# Patient Record
Sex: MALE | Race: WHITE | NOT HISPANIC OR LATINO
[De-identification: names, ages, dates, MRNs, and addresses within clinical notes are randomized per-mention and may not be internally consistent; named-entity substitution may affect disease eponyms.]

---

## 2017-11-03 ENCOUNTER — RX RENEWAL (OUTPATIENT)
Age: 37
End: 2017-11-03

## 2017-11-03 PROBLEM — Z00.00 ENCOUNTER FOR PREVENTIVE HEALTH EXAMINATION: Status: ACTIVE | Noted: 2017-11-03

## 2017-11-03 RX ORDER — VALACYCLOVIR 500 MG/1
500 TABLET, FILM COATED ORAL
Qty: 90 | Refills: 0 | Status: ACTIVE | COMMUNITY
Start: 2017-11-03 | End: 1900-01-01

## 2021-07-16 ENCOUNTER — EMERGENCY (EMERGENCY)
Facility: HOSPITAL | Age: 41
LOS: 0 days | Discharge: HOME | End: 2021-07-16
Attending: EMERGENCY MEDICINE
Payer: OTHER MISCELLANEOUS

## 2021-07-16 VITALS — WEIGHT: 250 LBS | HEIGHT: 72 IN

## 2021-07-16 VITALS
RESPIRATION RATE: 16 BRPM | SYSTOLIC BLOOD PRESSURE: 130 MMHG | DIASTOLIC BLOOD PRESSURE: 78 MMHG | OXYGEN SATURATION: 99 %

## 2021-07-16 DIAGNOSIS — T63.441A TOXIC EFFECT OF VENOM OF BEES, ACCIDENTAL (UNINTENTIONAL), INITIAL ENCOUNTER: ICD-10-CM

## 2021-07-16 DIAGNOSIS — T78.40XA ALLERGY, UNSPECIFIED, INITIAL ENCOUNTER: ICD-10-CM
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
## 2021-07-16 PROCEDURE — 96374 THER/PROPH/DIAG INJ IV PUSH: CPT

## 2021-07-16 PROCEDURE — 99284 EMERGENCY DEPT VISIT MOD MDM: CPT | Mod: 25

## 2021-07-16 PROCEDURE — 96375 TX/PRO/DX INJ NEW DRUG ADDON: CPT

## 2021-07-16 PROCEDURE — 93005 ELECTROCARDIOGRAM TRACING: CPT

## 2021-07-16 PROCEDURE — 99285 EMERGENCY DEPT VISIT HI MDM: CPT

## 2021-07-16 PROCEDURE — 93010 ELECTROCARDIOGRAM REPORT: CPT

## 2021-07-16 PROCEDURE — 96372 THER/PROPH/DIAG INJ SC/IM: CPT | Mod: XU

## 2021-07-16 RX ORDER — FAMOTIDINE 10 MG/ML
20 INJECTION INTRAVENOUS ONCE
Refills: 0 | Status: COMPLETED | OUTPATIENT
Start: 2021-07-16 | End: 2021-07-16

## 2021-07-16 RX ORDER — EPINEPHRINE 0.3 MG/.3ML
0.3 INJECTION INTRAMUSCULAR; SUBCUTANEOUS ONCE
Refills: 0 | Status: COMPLETED | OUTPATIENT
Start: 2021-07-16 | End: 2021-07-16

## 2021-07-16 RX ORDER — FAMOTIDINE 10 MG/ML
1 INJECTION INTRAVENOUS
Qty: 14 | Refills: 0
Start: 2021-07-16

## 2021-07-16 RX ORDER — EPINEPHRINE 0.3 MG/.3ML
0.3 INJECTION INTRAMUSCULAR; SUBCUTANEOUS
Qty: 1 | Refills: 0
Start: 2021-07-16

## 2021-07-16 RX ORDER — DIPHENHYDRAMINE HCL 50 MG
50 CAPSULE ORAL ONCE
Refills: 0 | Status: COMPLETED | OUTPATIENT
Start: 2021-07-16 | End: 2021-07-16

## 2021-07-16 RX ORDER — DIPHENHYDRAMINE HCL 50 MG
2 CAPSULE ORAL
Qty: 30 | Refills: 0
Start: 2021-07-16 | End: 2021-07-20

## 2021-07-16 RX ADMIN — FAMOTIDINE 20 MILLIGRAM(S): 10 INJECTION INTRAVENOUS at 11:32

## 2021-07-16 RX ADMIN — Medication 50 MILLIGRAM(S): at 11:31

## 2021-07-16 RX ADMIN — Medication 125 MILLIGRAM(S): at 11:36

## 2021-07-16 RX ADMIN — EPINEPHRINE 0.3 MILLIGRAM(S): 0.3 INJECTION INTRAMUSCULAR; SUBCUTANEOUS at 11:33

## 2021-07-16 NOTE — ED PROVIDER NOTE - OBJECTIVE STATEMENT
42 y/o  male with a PMHx of allergies of bee sting presents to the ED c/o allergic rxn to a bee sting PTA.  Has local swelling in right forearm  and rash. States he is 'feeling funny' and has an upper chest thrill. Denies coughing, drooling, HA, n/d. No drug use. 40 y/o  male with a PMHx of allergies of bee sting presents to the ED c/o allergic rxn to a bee sting PTA on right forearm.  Pt has local swelling in right forearm  and rash thorough out body. States he has a lump in throat.  Pt is itchy. Denies coughing, drooling, HA, n/d. No drug use.

## 2021-07-16 NOTE — ED ADULT NURSE NOTE - OBJECTIVE STATEMENT
pt Presents to ED from home, pt alert and oreitnedx4, VSS afebrile, pt c/o bee sting to right forearm today causing pain and swelling to area as well as hives swelling itching and reddens to  torso arms  back head ears. pt deneis SOb dizziness weakness chest pain. pt states he felt palpitations.  pt denies difficulty breathing pt speaking clearly. safety maintained medication administered epr md ordfer wioth releif noted

## 2021-07-16 NOTE — ED PROVIDER NOTE - PATIENT PORTAL LINK FT
You can access the FollowMyHealth Patient Portal offered by [facility] by registering at the following website: [email or web address]. By joining [employer]’s FollowMyHealth portal, you will also be able to view your health information using other applications (apps) compatible with our system.

## 2021-07-16 NOTE — ED PROVIDER NOTE - NSFOLLOWUPINSTRUCTIONS_ED_ALL_ED_FT
Please call and follow up with your doctor in 1-3 days.  Please call [phone] to find doctors in [facility] (allergist)      Anaphylaxis    WHAT YOU NEED TO KNOW:    Anaphylaxis is a life-threatening allergic reaction that must be treated immediately. Your risk for anaphylaxis increases if you have asthma that is severe or not controlled. Medical conditions such as heart disease can also increase your risk. It is important to be prepared if you are at risk for anaphylaxis. Your symptoms can be worse each time you are exposed to the trigger.     DISCHARGE INSTRUCTIONS:    Steps to take for signs or symptoms of anaphylaxis:   •Immediately give 1 shot of epinephrine only into the outer thigh muscle. Even if your allergic reaction seems mild, it can quickly become anaphylaxis. This may happen even if you had a mild reaction to the allergen in the past. Each exposure can cause a different reaction. Watch for signs and symptoms of anaphylaxis every time you are exposed to a trigger. Be ready to give a shot of epinephrine. It is okay to inject epinephrine through clothing. Just be careful to avoid seams, zippers, or other parts that can prevent the needle from entering your skin.   How to Give An Epinephrine Shot Adult           •Leave the shot in place as directed. Your healthcare provider may recommend you leave it in place for up to 10 seconds before you remove it. This helps make sure all of the epinephrine is delivered.       •Call 911 and go to the emergency department, even if the shot improved symptoms. Do not drive yourself. Bring the used epinephrine shot with you.       Call 911 for any of the following:   •You have a skin rash, hives, swelling, or itching.       •You have trouble breathing, shortness of breath, wheezing, or coughing.      •Your throat tightens or your lips or tongue swell.      •You have difficulty swallowing or speaking.      •You are dizzy, lightheaded, confused, or feel like you are going to faint.      •You have nausea, diarrhea, or abdominal cramps, or you are vomiting.      Seek care immediately if:   •Signs or symptoms of anaphylaxis return.           Contact your healthcare provider if:   •You have questions or concerns about your condition or care.          Medicines:   •Epinephrine is medicine used to treat severe allergic reactions such as anaphylaxis. It is given as a shot into the outer thigh muscle.      •Medicines such as antihistamines, steroids, and bronchodilators decrease inflammation, open airways, and make breathing easier.      •Take your medicine as directed. Contact your healthcare provider if you think your medicine is not helping or if you have side effects. Tell him or her if you are allergic to any medicine. Keep a list of the medicines, vitamins, and herbs you take. Include the amounts, and when and why you take them. Bring the list or the pill bottles to follow-up visits. Carry your medicine list with you in case of an emergency.      Follow up with your healthcare provider as directed: Allergy testing may reveal allergies that can trigger anaphylaxis. Write down your questions so you remember to ask them during your visits.     Safety precautions:   •Keep 2 shots of epinephrine with you at all times. You may need a second shot, because epinephrine only works for about 20 minutes and symptoms may return. Your healthcare provider can show you and family members how to give the shot. Check the expiration date every month and replace it before it expires.      •Create an action plan. Your healthcare provider can help you create a written plan that explains the allergy and an emergency plan to treat a reaction. The plan explains when to give a second epinephrine shot if symptoms return or do not improve after the first. Give copies of the action plan and emergency instructions to family members, and work or school staff. Show them how to give a shot of epinephrine in case you are not able to give it to yourself.      •Be careful when you exercise. If you have had exercise-induced anaphylaxis, do not exercise right after you eat. Stop exercising right away if you start to develop any signs or symptoms of anaphylaxis. You may first feel tired, warm, or have itchy skin. Hives, swelling, and severe breathing problems may develop if you continue to exercise.      •Carry medical alert identification. Wear medical alert jewelry or carry a card that explains the allergy. Ask your healthcare provider where to get these items.   Medical Alert Jewelry           •Identify and avoid known triggers. Read food labels for ingredients. Look for triggers in your environment.      •Ask about treatments to prevent anaphylaxis. You may need allergy shots or other medicines to treat allergies.         Bee, Wasp, or Hornet Sting, Adult      Bees, wasps, and hornets are part of a family of insects that can sting people. These stings can cause pain and inflammation, but they are usually not serious. However, some people may have an allergic reaction to a sting. This can cause the symptoms to be more severe.      What increases the risk?  You may be at a greater risk of getting stung if you:  •Provoke a stinging insect by swatting or disturbing it.      •Wear strong-smelling soaps, deodorants, or body sprays.      •Spend time outdoors near gardens with flowers or fruit trees or in clothes that expose skin.      •Eat or drink outside.        What are the signs or symptoms?  Common symptoms of this condition include:  •A red lump in the skin that sometimes has a tiny hole in the center. In some cases, a stinger may be in the center of the wound.      •Pain and itching at the sting site.      •Redness and swelling around the sting site. If you have an allergic reaction (localized allergic reaction), the swelling and redness may spread out from the sting site. In some cases, this reaction can continue to develop over the next 24–48 hours.    In rare cases, a person may have a severe allergic reaction (anaphylactic reaction) to a sting. Symptoms of an anaphylactic reaction may include:  •Wheezing or difficulty breathing.      •Raised, itchy, red patches on the skin (hives).      •Nausea or vomiting.      •Abdominal cramping.      •Diarrhea.      •Tightness in the chest or chest pain.      •Dizziness or fainting.      •Redness of the face (flushing).      •Hoarse voice.      •Swollen tongue, lips, or face.        How is this diagnosed?    This condition is usually diagnosed based on your symptoms and medical history as well as a physical exam. You may have an allergy test to determine if you are allergic to the substance that the insect injected during the sting (venom).      How is this treated?    If you were stung by a bee, the stinger and a small sac of venom may be in the wound. It is important to remove the stinger as soon as possible. You can do this by brushing across the wound with gauze, a fingernail, or a flat card such as a credit card. Removing the stinger can help reduce the severity of your body’s reaction to the sting.  Most stings can be treated with:  •Icing to reduce swelling in the area.      •Medicines (antihistamines) to treat itching or an allergic reaction.      •Medicines to help reduce pain. These may be medicines that you take by mouth, or medicated creams or lotions that you apply to your skin.      Pay close attention to your symptoms after you have been stung. If possible, have someone stay with you to make sure you do not have an allergic reaction. If you have any signs of an allergic reaction, call your health care provider. If you have ever had a severe allergic reaction, your health care provider may give you an inhaler or injectable medicine (epinephrine auto-injector) to use if necessary.      Follow these instructions at home:     •Wash the sting site 2–3 times each day with soap and water as told by your health care provider.      •Apply or take over-the-counter and prescription medicines only as told by your health care provider.    •If directed, apply ice to the sting area.  •Put ice in a plastic bag.      •Place a towel between your skin and the bag.      •Leave the ice on for 20 minutes, 2–3 times a day.        • Do not scratch the sting area.    •If you had a severe allergic reaction to a sting, you may need:  •To wear a medical bracelet or necklace that lists the allergy.      •To learn when and how to use an anaphylaxis kit or epinephrine injection. Your family members and coworkers may also need to learn this.      •To carry an anaphylaxis kit or epinephrine injection with you at all times.          How is this prevented?    •Avoid swatting at stinging insects and disturbing insect nests.      • Do not use fragrant soaps or lotions.      •Wear shoes, pants, and long sleeves when spending time outdoors, especially in grassy areas where stinging insects are common.      •Keep outdoor areas free from nests or hives.      •Keep food and drink containers covered when eating outdoors.      •Avoid working or sitting near flowering plants, if possible.      •Wear gloves if you are gardening or working outdoors.      •If an attack by a stinging insect or a swarm seems likely in the moment, move away from the area or find a barrier between you and the insect(s), such as a door.        Contact a health care provider if:    •Your symptoms do not get better in 2–3 days.      •You have redness, swelling, or pain that spreads beyond the area of the sting.      •You have a fever.        Get help right away if:  You have symptoms of a severe allergic reaction. These include:  •Wheezing or difficulty breathing.      •Tightness in the chest or chest pain.      •Light-headedness or fainting.      •Itchy, raised, red patches on the skin.      •Nausea or vomiting.      •Abdominal cramping.      •Diarrhea.      •A swollen tongue or lips, or trouble swallowing.      •Dizziness or fainting.        Summary    •Stings from bees, wasps, and hornets can cause pain and inflammation, but they are usually not serious. However, some people may have an allergic reaction to a sting. This can cause the symptoms to be more severe.      •Pay close attention to your symptoms after you have been stung. If possible, have someone stay with you to make sure you do not have an allergic reaction.      •Call your health care provider if you have any signs of an allergic reaction.      This information is not intended to replace advice given to you by your health care provider. Make sure you discuss any questions you have with your health care provider.

## 2021-07-16 NOTE — ED PROVIDER NOTE - CLINICAL SUMMARY MEDICAL DECISION MAKING FREE TEXT BOX
40 y/o male with a PMHx of allergies to bee sting presents to ED c/o allergic rxn. Concern for anaphylaxis / allergic rxn to bee sting. Plan: medications and observe in ED for 4 hrs

## 2021-07-16 NOTE — ED PROVIDER NOTE - ENMT, MLM
Airway patent and face mask intact Airway patent, Nasal mucosa clear. Mouth with normal mucosa. Throat has no vesicles, no oropharyngeal exudates and uvula is midline.

## 2021-07-16 NOTE — ED PROVIDER NOTE - SKIN, MLM
Skin normal color for race, warm, dry and intact. hives across back and torso. Skin normal color for race, warm, dry. hives across back and torso. Hives across back and torso.

## 2021-07-16 NOTE — ED PROVIDER NOTE - NS ED SCRIBE STATEMENT
Continue betamethasone a with clotrimazole cream    I discontinued prescription for clotrimazole alone    Please check with Rachelle, anticoagulation clinic  who is managing warfarin, as to what the current dose is and send prescription in to [facility]    Patient is to be on lifelong warfarin   Attending

## 2021-07-16 NOTE — ED PROVIDER NOTE - PROGRESS NOTE DETAILS
Adrianna Ruiz for attending Dr. Frias: no respiratory distress ,rash improving Attending Frias, Pt feeling better.  No difficulty breathing.  pt still with local swelling right forearm.

## 2021-07-16 NOTE — ED PROVIDER NOTE - CPE EDP NEURO NORM
Pt reeducated to call [facility] and the  will set him up for a hand specialist appt.  Pt is aware that he must work out his financial responsibilities with [facility].      at work said an appt was offered but pt turned it down.  Pt reporting he thought is had to be the MD on the referral, Dr. Beckford.  
normal...

## 2021-07-16 NOTE — ED ADULT TRIAGE NOTE - CHIEF COMPLAINT QUOTE
Pt reports hx of bee sting in past with allergic reaction. Pt reports bee sting approx 30 mins ago with rash, itch, and feeling of swelling in chest, throat, mouth. Tolerating secretions with no visible angioedema. Direct bed to main.

## 2021-07-16 NOTE — ED PROVIDER NOTE - CARE PLAN
Principal Discharge DX:	Allergic reaction, initial encounter  Secondary Diagnosis:	Bee sting, undetermined intent, initial encounter

## 2021-07-16 NOTE — ED PROVIDER NOTE - MUSCULOSKELETAL, MLM
Spine appears normal, range of motion is not limited, no muscle or joint tenderness. right forearm swollen

## 2021-07-26 ENCOUNTER — OUTPATIENT (OUTPATIENT)
Dept: OUTPATIENT SERVICES | Facility: HOSPITAL | Age: 41
LOS: 1 days | End: 2021-07-26
Payer: COMMERCIAL

## 2021-07-26 DIAGNOSIS — Z20.828 CONTACT WITH AND (SUSPECTED) EXPOSURE TO OTHER VIRAL COMMUNICABLE DISEASES: ICD-10-CM

## 2021-07-26 PROBLEM — Z78.9 OTHER SPECIFIED HEALTH STATUS: Chronic | Status: ACTIVE | Noted: 2021-07-20

## 2021-07-26 LAB — SARS-COV-2 RNA SPEC QL NAA+PROBE: SIGNIFICANT CHANGE UP

## 2021-07-26 PROCEDURE — U0005: CPT

## 2021-07-26 PROCEDURE — U0003: CPT

## 2021-07-26 PROCEDURE — C9803: CPT

## 2021-07-27 DIAGNOSIS — Z20.828 CONTACT WITH AND (SUSPECTED) EXPOSURE TO OTHER VIRAL COMMUNICABLE DISEASES: ICD-10-CM

## 2023-01-13 NOTE — ED PROVIDER NOTE - CROS ED ROS STATEMENT
Message sent to ordering MD requesting pre-medication for CTA cardiac  Good Morning Dr Scotty Anderson,     Your patient Aurelio Jaeger  [birth year] is scheduled here at [place] [address] on  23 for a cardiac CTA, I am aware that currently your office is pursuing with his insurance for a cardiac cath  His old insurance from what I read denied the cath till after the CTA Cardiac is done  If the CTA cardiac should go forward, I have reviewed the notes and vital signs on the patient, I am noticing his HR in the range of low 80's and high 90's consistently  I am writing to you to consider ordering pre-medication of Metoprolol for your patient  He  is currently on no beta blockers  During the study I am allowed to administer a limited amount of Metoprolol  if needed to  heart rate to between 55-60  beats per minute for optimal exam results  It is with many years experience that patients that originally have a high rate accomplishing the low heart rate not attainable in the time frame that is given and many studies have been cancelled to only be rescheduled at a much later date after the Cardiologist orders pre medication for the patient  I don't want your patients time and travel to be in vain  Please consider providing orders for a Beta Blocker, our Radiologist prefer and suggest Metoprolol 100 mg to be taken an hour prior to the study  If you should order medication please send it to the pharmacy on file and also please have someone from your office inform the patient of awaiting medication  Have a nice day and thank you for your time and attention to this matter  If any question feel free to call,   Armin Main RN  [facility] [place] [facility] RN  [address] [place] [address]  [phone] (Office)  [phone] (Fax)  [email or web address]
all other ROS negative except as per HPI

## 2024-10-31 ENCOUNTER — NON-APPOINTMENT (OUTPATIENT)
Age: 44
End: 2024-10-31